# Patient Record
Sex: MALE | Race: BLACK OR AFRICAN AMERICAN | NOT HISPANIC OR LATINO | Employment: UNEMPLOYED | URBAN - METROPOLITAN AREA
[De-identification: names, ages, dates, MRNs, and addresses within clinical notes are randomized per-mention and may not be internally consistent; named-entity substitution may affect disease eponyms.]

---

## 2018-12-04 ENCOUNTER — HOSPITAL ENCOUNTER (EMERGENCY)
Facility: HOSPITAL | Age: 38
Discharge: HOME | End: 2018-12-04
Attending: FAMILY MEDICINE

## 2018-12-04 VITALS
HEIGHT: 71 IN | SYSTOLIC BLOOD PRESSURE: 135 MMHG | WEIGHT: 180 LBS | DIASTOLIC BLOOD PRESSURE: 80 MMHG | RESPIRATION RATE: 20 BRPM | HEART RATE: 92 BPM | OXYGEN SATURATION: 98 % | BODY MASS INDEX: 25.2 KG/M2 | TEMPERATURE: 98 F

## 2018-12-04 DIAGNOSIS — S20.212A CONTUSION OF RIB ON LEFT SIDE, INITIAL ENCOUNTER: Primary | ICD-10-CM

## 2018-12-04 DIAGNOSIS — R07.9 CHEST PAIN: ICD-10-CM

## 2018-12-04 PROCEDURE — 71101 X-RAY EXAM UNILAT RIBS/CHEST: CPT | Mod: 26,LT,, | Performed by: RADIOLOGY

## 2018-12-04 PROCEDURE — 99284 EMERGENCY DEPT VISIT MOD MDM: CPT | Mod: 25

## 2018-12-04 PROCEDURE — 25000003 PHARM REV CODE 250: Performed by: NURSE PRACTITIONER

## 2018-12-04 PROCEDURE — 93005 ELECTROCARDIOGRAM TRACING: CPT

## 2018-12-04 RX ORDER — IBUPROFEN 400 MG/1
800 TABLET ORAL
Status: COMPLETED | OUTPATIENT
Start: 2018-12-04 | End: 2018-12-04

## 2018-12-04 RX ADMIN — IBUPROFEN 800 MG: 400 TABLET, FILM COATED ORAL at 06:12

## 2018-12-04 NOTE — ED PROVIDER NOTES
Encounter Date: 12/4/2018       History     Chief Complaint   Patient presents with    Fall     fell off a 6 foot ladder, complaining of left ribs and left pelvis     Johnson Armando is a 38 y.o male with no sign pmhx. He presents to ED with left rib pain after fall from ladder.  Patient denies head injury or LOC  No cough or SOB          Review of patient's allergies indicates:  No Known Allergies  No past medical history on file.  No past surgical history on file.  No family history on file.  Social History     Tobacco Use    Smoking status: Not on file   Substance Use Topics    Alcohol use: Not on file    Drug use: Not on file     Review of Systems   Constitutional: Negative for fever.   HENT: Negative for sore throat.    Respiratory: Negative for shortness of breath.    Cardiovascular: Positive for chest pain (left rib pain).   Gastrointestinal: Negative for nausea.   Genitourinary: Negative for dysuria.   Musculoskeletal: Negative for back pain.   Skin: Negative for rash.   Neurological: Negative for weakness.   Hematological: Does not bruise/bleed easily.   All other systems reviewed and are negative.      Physical Exam     Initial Vitals [12/04/18 1705]   BP Pulse Resp Temp SpO2   135/80 92 20 98.3 °F (36.8 °C) 98 %      MAP       --         Physical Exam    Nursing note and vitals reviewed.  Constitutional: He appears well-developed and well-nourished.   HENT:   Head: Normocephalic.   Eyes: Pupils are equal, round, and reactive to light.   Neck: Normal range of motion.   Cardiovascular: Normal rate and regular rhythm.   Pulmonary/Chest: Breath sounds normal. He exhibits tenderness.       Neurological: He is alert and oriented to person, place, and time.   Skin: Skin is warm and dry.   Psychiatric: He has a normal mood and affect. His behavior is normal. Judgment and thought content normal.         ED Course   Procedures  Labs Reviewed - No data to display       Imaging Results    None          Medical  Decision Making:   Initial Assessment:   Patient with left rib pain after fall from ladder.  Patient denies head injury or LOC    Differential Diagnosis:   Rib fracture   Rib contusion  ED Management:  While discussing XR results patient mention intermittent left CP x 1 week. EKG performed and normal. He denies chest pain at this time.    Motrin for pain    XR and EKG reviewed by Dr. Lakhani    Discussed physical exam findings with patient  No acute emergent medication condition identified at this time to warrant further testing/diagnostics.  Plan to discharge patient home with instructions to follow-up with PCP in 2-5 days or return to ED if symptoms worsen.  Patient verbalized agreement to discharge treatment plan.                        Clinical Impression:   The primary encounter diagnosis was Contusion of rib on left side, initial encounter. A diagnosis of Chest pain was also pertinent to this visit.      Disposition:   Disposition: Discharged                        Indira Hui NP  12/04/18 6111

## 2018-12-06 ENCOUNTER — TELEPHONE (OUTPATIENT)
Dept: EMERGENCY MEDICINE | Facility: HOSPITAL | Age: 38
End: 2018-12-06

## 2018-12-06 NOTE — TELEPHONE ENCOUNTER
Attempted to call patient per request of LOUIS Hui NP and Dr. Crispin SOLOMON to discuss results of x-ray over-read findings (fracture of 5,6 7, and 9th rib). Unable to reach patient; voicemail left.